# Patient Record
(demographics unavailable — no encounter records)

---

## 2024-11-12 NOTE — IMAGING
[de-identified] : left elbow no deformity full active range of motion no tenderness to palpation +tinels at the cubital tunnel +elbow flexion/compression test decreased sensation in the ulnar nerve +instrinsic weakness 4/5 median and radial sensation intact ain/pin motor intact palpable pulses with CR<2 full active motion of the shoulder and hand  Left hand/wrist 3 view xrays performed 11/12/2024 showed: no bony pathology.

## 2024-11-12 NOTE — HISTORY OF PRESENT ILLNESS
[8] : 8 [Burning] : burning [Dull/Aching] : dull/aching [Intermittent] : intermittent [Leisure] : leisure [Nothing helps with pain getting better] : Nothing helps with pain getting better [Standing] : standing [Walking] : walking [de-identified] : 11/12/24: RHD 45 yo female is here for pain in her LT hand/ wrist. pt states that she is having a lot of pain in her left hand and wrist. Had MRIs done in 2/2022 and was told she had a tear but had no treatment done.  Pt states that pain has gotten worse over the last couple of months.  Numbness/ tingling in her hand today.  PMH: Denied Allergies: NKDA [] : no [FreeTextEntry1] : LT Hand/ Wrist

## 2024-11-12 NOTE — ASSESSMENT
[FreeTextEntry1] : The patient was advised of the diagnosis. The natural history of the pathology was explained in full to the patient in layman's terms. All questions were answered. The risks and benefits of surgical and non-surgical treatment alternatives were explained in full to the patient.  pt referred for EMG testing, as she has had left upper extremity tingling/numbness x 2 yrs intermittently.  Recommend night splint x 3 weeks.   RTO s/p EMG